# Patient Record
Sex: FEMALE | Race: OTHER | URBAN - NONMETROPOLITAN AREA
[De-identification: names, ages, dates, MRNs, and addresses within clinical notes are randomized per-mention and may not be internally consistent; named-entity substitution may affect disease eponyms.]

---

## 2018-01-15 ENCOUNTER — APPOINTMENT (RX ONLY)
Dept: URBAN - NONMETROPOLITAN AREA CLINIC 22 | Facility: CLINIC | Age: 78
Setting detail: DERMATOLOGY
End: 2018-01-15

## 2018-01-15 VITALS — WEIGHT: 177 LBS | HEIGHT: 65 IN

## 2018-01-15 DIAGNOSIS — L57.8 OTHER SKIN CHANGES DUE TO CHRONIC EXPOSURE TO NONIONIZING RADIATION: ICD-10-CM

## 2018-01-15 DIAGNOSIS — D22 MELANOCYTIC NEVI: ICD-10-CM

## 2018-01-15 DIAGNOSIS — Z87.2 PERSONAL HISTORY OF DISEASES OF THE SKIN AND SUBCUTANEOUS TISSUE: ICD-10-CM

## 2018-01-15 DIAGNOSIS — L57.0 ACTINIC KERATOSIS: ICD-10-CM

## 2018-01-15 PROBLEM — E13.9 OTHER SPECIFIED DIABETES MELLITUS WITHOUT COMPLICATIONS: Status: ACTIVE | Noted: 2018-01-15

## 2018-01-15 PROBLEM — I63.50 CEREBRAL INFARCTION DUE TO UNSPECIFIED OCCLUSION OR STENOSIS OF UNSPECIFIED CEREBRAL ARTERY: Status: ACTIVE | Noted: 2018-01-15

## 2018-01-15 PROBLEM — D22.62 MELANOCYTIC NEVI OF LEFT UPPER LIMB, INCLUDING SHOULDER: Status: ACTIVE | Noted: 2018-01-15

## 2018-01-15 PROCEDURE — ? LIQUID NITROGEN

## 2018-01-15 PROCEDURE — 99213 OFFICE O/P EST LOW 20 MIN: CPT | Mod: 25

## 2018-01-15 PROCEDURE — ? COUNSELING

## 2018-01-15 PROCEDURE — 17000 DESTRUCT PREMALG LESION: CPT

## 2018-01-15 ASSESSMENT — LOCATION SIMPLE DESCRIPTION DERM
LOCATION SIMPLE: LEFT SHOULDER
LOCATION SIMPLE: RIGHT CHEEK

## 2018-01-15 ASSESSMENT — LOCATION ZONE DERM
LOCATION ZONE: ARM
LOCATION ZONE: FACE

## 2018-01-15 ASSESSMENT — LOCATION DETAILED DESCRIPTION DERM
LOCATION DETAILED: LEFT ANTERIOR SHOULDER
LOCATION DETAILED: RIGHT LATERAL MALAR CHEEK

## 2018-01-15 NOTE — PROCEDURE: LIQUID NITROGEN
Detail Level: Detailed
Consent: The patient's consent was obtained including but not limited to risks of crusting, scabbing, blistering, scarring, darker or lighter pigmentary change, recurrence, incomplete removal and infection.
Post-Care Instructions: I reviewed with the patient in detail post-care instructions. Patient is to wear sunprotection, and avoid picking at any of the treated lesions. Pt may apply Vaseline to crusted or scabbing areas.
Number Of Freeze-Thaw Cycles: 1 freeze-thaw cycle
Duration Of Freeze Thaw-Cycle (Seconds): 0
Render Post-Care Instructions In Note?: no

## 2025-06-05 NOTE — HPI: EVALUATION OF SKIN LESION(S)
Chronic Disease Management (CDM) Services  Progress Note    Referring Provider: Navneet Vásquez MD  Supervising Provider: Navneet Vásquez MD  Order Date: 04/18/25    HPI    Abbie Acosta is a 54 year old Black/ female presenting for follow-up clinic visit for management of diabetes.     Continues to experience occasional episodes of severe GI upset after taking metformin with only a small amount of food. During these episodes she needs to stop the medication for 3 days to let her belly recover. Misses doses of her other medications ~1 time per week d/t forgetfulness. Only checking blood sugars 3-4 times per week. Forgot to bring in glucometer today for assessment. Not interested in CGM. Patient has eliminated most regular soda (previously drank 1-2 glasses per day) however still drinks coffee with cream + regular sugar most days. Overindulges in fast food, but is trying to watch her carbohydrate portions. Denies other complaints at today's visit.     Pertinent PMH:   T2DM  CAD  STEMI s/p stent x2 (04/2022)  HTN  HLD  Asthma  Vitamin D deficiency  Obesity    Visit History:   06/05/24: CPM  04/18/25: CPM  03/07/25: CONT metformin  mg daily first first meal, INCR lisinopril 20 mg daily, STOP glipizide   01/10/25: START glipizide XL 5 mg daily w/ first meal, STOP metformin (didn't make changes)  12/06/24: INCR lisinopril 10 mg BID and Toprol  mg daily  11/01/24: DECR Lantus 30 units daily (took 35 units)  09/27/24: INCR Ozempic 2 mg weekly, DECR Lantus 36 units daily (35 units), STOP glipizide  08/30/24: INCR Ozempic 1 mg weekly, CHANGE metformin  mg 2 tablets daily (continued 500 mg BID), STOP glipizide (never discontinued)   07/02/24: CHANGE Trulicity to Ozempic 0.5 mg weekly, CONT metformin  mg BID  10/19/23: CPM   05/25/23: DECR glyburide to 5 mg daily  03/24/23: INCR Trulicity 1.5 mg weekly  08/02/22: START Jardiance 10 mg daily and Trulicity 0.75 mg weekly    Recent  Hpi Title: Evaluation of Skin Lesions Hospitalizations: None    Symptoms: none    Routine Health Maintenance:  Statin: Yes  ACE / ARB / ARNI: Yes  Aspirin: Yes  Dilated eye exam: Up to date  Foot exam: Not up to date  UACR: Up to date    Diet: eating 2-3 meals per day (largest meal is dinner)  Breakfast: eggs, sausage, pancakes, cereal or grilled cheese on occasion  Lunch: chicken sandwich, fries, potato chips, salad   Dinner: chicken, Polish sausage, tacos, spaghetti, fries, green beans, mixed vegetables, greens  Snacks: potato chips, plums, pears, oranges, apples, strawberries, grapes, watermelon, candy on occasion  Beverages: regular or sparkling water, Sparkling ICE Flavored Water, diet soda, regular soda 2-3x/wk (previously drinking 1-2 glass regular soda daily), coffee w/ cream + sugar most days, Lactaid, Minute Maid Zero Sugar (previously regular cranberry or grape juice 5-6x/wk), Kirk Peach Tea (16 gm) 1-2x/wk  Sodium restriction: no  Dining out: 4-5 times per week    Lifestyle:  Exercise: walking ~10,000 steps per day  Alcohol consumption: 1 standard drink on occasion  Nicotine use: denies use  Substance use: no    Medication Adherence: medications reconciled at today's visit  Barriers to Adherence: forgetfulness  Missed doses of medications: Yes, see HPI above for details  Taken medications today? Yes  Medication intolerances: metformin IR and metformin ER > 1000 mg/day or > 500 mg/dose (nausea, GI upset, diarrhea)    Current Outpatient Medications   Medication Instructions    albuterol (VENTOLIN) 2.5 mg, Nebulization, EVERY 6 HOURS PRN, USE 1 UNIT DOSE IN NEBULIZER EVERY 4 HOURS AS NEEDED     albuterol 108 (90 Base) MCG/ACT inhaler 2 puffs, Inhalation, EVERY 4 HOURS PRN    Alcohol Swabs Pads Use to check blood sugar twice daily as directed. Please fill for whatever is covered under patient's insurance.    Aspirin Low Dose 81 MG EC tablet TAKE 1 TABLET DAILY    atorvastatin (LIPITOR) 80 mg, Oral, DAILY    blood glucose (OneTouch Ultra Test)  test strip Use to check blood sugar twice daily as directed    Blood Glucose Monitoring Suppl w/Device Kit Use to check blood sugar twice daily as directed. Please fill for whatever is covered under patient's insurance.    clobetasol (TEMOVATE) 0.05 % ointment [None received]    estradiol (ESTRACE) 0.1 MG/GM vaginal cream [None received]    ezetimibe (ZETIA) 10 mg, Oral, DAILY    Insulin Pen Needle (Pen Needles) 32G X 4 MM Misc Use one pen needle to inject insulin daily as directed. May substitute whatever brand is covered.    Jardiance 25 mg, Oral, DAILY BEFORE BREAKFAST    Lancets Thin Misc Use to check blood sugar twice daily as directed. Please fill for whatever is covered under patient's insurance.    Lantus SoloStar 35 Units, Subcutaneous, NIGHTLY, For every 3 days in a row with a fasting blood sugar >130, increase by 2 units. Max dose: 50 units daily. Prime 2 units before each dose.    lisinopril (ZESTRIL) 20 mg, Oral, DAILY    metFORMIN (GLUCOPHAGE-XR) 500 mg, Oral, DAILY WITH BREAKFAST    metoPROLOL succinate (TOPROL-XL) 100 mg, Oral, DAILY    Multiple Vitamins-Minerals (vitamin - therapeutic multivitamins w/minerals) tablet 1 tablet, Oral, DAILY    Ozempic (2 MG/DOSE) 2 mg, Subcutaneous, EVERY 7 DAYS    Vitamin D3 125 mcg, Oral, DAILY     ALLERGIES:   Allergen Reactions    Iodine   (Environmental Or Med) Other (See Comments)     Unknown  Was told from childhood     Shellfish-Derived Products   (Food Or Med) Other (See Comments)     Throat swellilng     Vitals:  BP Readings from Last 3 Encounters:   06/05/25 132/72   04/18/25 132/70   03/07/25 (!) 141/77     Pulse Readings from Last 3 Encounters:   06/05/25 86   04/18/25 90   03/07/25 94     Wt Readings from Last 3 Encounters:   06/05/25 76.2 kg   04/18/25 75.1 kg   03/07/25 77.6 kg       Labs:  Glucose Blood, POC (mg/dL)   Date Value   06/05/2025 87     Hemoglobin A1C (%)   Date Value   04/18/2025 7.9 (H)     Potassium (mmol/L)   Date Value   04/18/2025  4.2     Creatinine (mg/dL)   Date Value   2025 0.66     Glomerular Filtration Rate (no units)   Date Value   2025 >90     Microalbumin/ Creatinine Ratio (mg/g)   Date Value   2024 8.3     TSH (mcUnits/mL)   Date Value   2022 1.169     Vitamin D, 25-Hydroxy (ng/mL)   Date Value   2024 23.3 (L)      Cholesterol (mg/dL)   Date Value   2024 111     HDL (mg/dL)   Date Value   2024 41 (L)     LDL (mg/dL)   Date Value   2024 41     Triglycerides (mg/dL)   Date Value   2024 145     The ASCVD Risk score (Trish DELVALLE, et al., 2019) failed to calculate for the following reasons:    The valid total cholesterol range is 130 to 320 mg/dL     Assessment / Plan    Diabetes    Type 2 Diabetes  Goal(s): A1c <7.0%, FPG  mg/dL, 2-hour PPG <180 mg/dL    Assessment:   A1c: not at goal  SMBG: available to assess per patient report; at goal  Patient testing 3-4 times per week:  FP-120s mg/dL; at goal  PPG: mg/dL; unable to assess  Hypoglycemia: none  In Office B mg/dL FPG; at goal    Current medications:  Ozempic 2 subcut injection weekly  Jardiance 25 mg daily  Lantus 35 units daily at bedtime  Metformin  mg  mg daily with first meal    Recommendations:   CPM Ozempic, Jardiance, Lantus, metformin per patient preference  Discussed with patient transitioning from metformin to glipizide or pioglitazone d/t reported GI side effects however patient declined wanting to finish current supply of metformin  Noted previous intolerance to metformin IR and metformin ER > 1000 mg/day or > 500 mg/dose (nausea, GI upset, diarrhea)  Advised patient to call clinic or message via portal if blood sugars consistently < 70 mg/dL or > 200 mg/dL   Educated patient on lifestyle modifications including increasing non-starchy vegetable intake and reducing carbohydrate intake while paying close attention to serving sizes. Also educated patient on importance of increasing exercise and  encouraged a goal of at least 150 minutes/week of moderate-intensity physical activity.   Discussed with patient how combining carbs with a protein +/- fats can promote more stable blood sugar levels  Patient to work on reducing consumption of fast food and sugary beverages to help positively impact diabetes. Suggested cooking more meals at home as well as eliminating regular sugar with her coffee. Patient states she can use Splenda.     Offered to order patient the Dexcom G7 CGM system however patient declined  Stressed the importance of regular blood sugar monitoring. Recommended to check SMBG daily alternating fasting and postprandial. Reminded patient to bring glucometer to next visit.     Hypertension    Goal(s): <130/80 mmHg    Assessment:  SMBP: mmHg; not testing  Office BP: at goal    Current medications:  Lisinopril 20 mg daily  Metoprolol succinate  mg daily (STEMI s/p stent x2)    Recommendations:   CPM lisinopril, metoprolol   Reviewed low sodium, heart healthy diet with patient. Reviewed hidden sources of sodium, encouraged to read food labels using the 5%/20% rule.   Patient to work on reducing consumption of caffeine to help positively impact her BP  Encouraged patient to monitor BP at home 2-3 times per week and log indicating any mitigating factors     Lipids    Goal(s): LDL < 70 mg/dL, secondary prevention (STEMI s/p stent x2)    Assessment:  Lipids: LDL at goal    Current medications:  Atorvastatin 80 mg daily  Ezetimibe 10 mg daily  Aspirin 81 mg daily    Recommendations:   CPM atorvastatin, ezetimibe, aspirin   Discussed with patient how a heart healthy diet low in saturated fats and moderate physical activity as tolerated can positively impact lipid levels     Labs due prior to next visit:  HgbA1c, CMP, and Lipid     Follow-up:   PharmD follow up: 07/21/25  PCP follow up: 07/21/25    Counseling:  General  -Hypoglycemia - signs and symptoms  -A1c and SMBG goals  -Importance of checking  blood glucose as directed  -Importance of limiting excess carbohydrate intake  -Importance of eating a balanced diet that includes vegetables, fruits, and whole grains  -Importance of avoiding fried foods, red meat, butter, cheese, and other foods with saturated fats  -Importance of low sodium diet  -Importance of exercise  -Importance of limiting caffeine intake  -Importance of blood pressure control  -Start home blood pressure monitoring    Medications  -Patient was provided with an updated medication list with all current medications and instructions on how / what time to take  -Metformin - side effects: GI upset, diarrhea (take with food)      Patient verbalized understanding and agreement with plan. Patient provided with information about how to contact CDM Clinician with any questions.     30 minutes were spent via face-to-face discussion related to the medical management of the patient.     Lázaro Mcallister, PharmD, BCACP, [facility]  Ambulatory Pharmacy Specialist - Chronic Disease Management   [facility] [place] [address]  [email or web address]  (P) [phone]  (F) [phone]